# Patient Record
Sex: MALE | Race: WHITE | NOT HISPANIC OR LATINO | Employment: UNEMPLOYED | URBAN - NONMETROPOLITAN AREA
[De-identification: names, ages, dates, MRNs, and addresses within clinical notes are randomized per-mention and may not be internally consistent; named-entity substitution may affect disease eponyms.]

---

## 2023-06-01 ENCOUNTER — OFFICE VISIT (OUTPATIENT)
Dept: INTERNAL MEDICINE | Facility: OTHER | Age: 53
End: 2023-06-01
Payer: COMMERCIAL

## 2023-06-01 VITALS
SYSTOLIC BLOOD PRESSURE: 116 MMHG | TEMPERATURE: 97.8 F | BODY MASS INDEX: 28.71 KG/M2 | HEART RATE: 66 BPM | WEIGHT: 212 LBS | HEIGHT: 72 IN | RESPIRATION RATE: 20 BRPM | OXYGEN SATURATION: 97 % | DIASTOLIC BLOOD PRESSURE: 70 MMHG

## 2023-06-01 DIAGNOSIS — Z98.1 S/P LUMBAR SPINAL FUSION: ICD-10-CM

## 2023-06-01 DIAGNOSIS — Z48.02 VISIT FOR SUTURE REMOVAL: Primary | ICD-10-CM

## 2023-06-01 PROCEDURE — G0463 HOSPITAL OUTPT CLINIC VISIT: HCPCS

## 2023-06-01 PROCEDURE — 99202 OFFICE O/P NEW SF 15 MIN: CPT

## 2023-06-01 RX ORDER — LANOLIN ALCOHOL/MO/W.PET/CERES
3 CREAM (GRAM) TOPICAL
COMMUNITY
Start: 2022-07-08

## 2023-06-01 RX ORDER — BISACODYL 10 MG
10 SUPPOSITORY, RECTAL RECTAL
COMMUNITY
Start: 2022-04-20

## 2023-06-01 RX ORDER — METOPROLOL TARTRATE 25 MG/1
25 TABLET, FILM COATED ORAL
COMMUNITY
Start: 2022-07-08

## 2023-06-01 RX ORDER — OLANZAPINE 10 MG/1
1 TABLET ORAL AT BEDTIME
COMMUNITY
Start: 2022-07-08

## 2023-06-01 RX ORDER — POLYETHYLENE GLYCOL 3350 17 G/17G
17 POWDER, FOR SOLUTION ORAL
COMMUNITY
Start: 2022-07-08 | End: 2023-01-01

## 2023-06-01 RX ORDER — DULOXETIN HYDROCHLORIDE 60 MG/1
120 CAPSULE, DELAYED RELEASE ORAL
COMMUNITY
Start: 2022-07-08

## 2023-06-01 RX ORDER — BUPROPION HYDROCHLORIDE 100 MG/1
100 TABLET, EXTENDED RELEASE ORAL
COMMUNITY
Start: 2022-07-08

## 2023-06-01 RX ORDER — TAMSULOSIN HYDROCHLORIDE 0.4 MG/1
0.4 CAPSULE ORAL
COMMUNITY
Start: 2022-07-08

## 2023-06-01 RX ORDER — DIAZEPAM 5 MG
5 TABLET ORAL
COMMUNITY
Start: 2023-05-25

## 2023-06-01 RX ORDER — PREDNISONE 10 MG/1
TABLET ORAL
COMMUNITY
Start: 2023-05-12

## 2023-06-01 RX ORDER — CLOPIDOGREL BISULFATE 75 MG/1
75 TABLET ORAL
COMMUNITY
Start: 2022-07-08

## 2023-06-01 RX ORDER — LEVOTHYROXINE SODIUM 175 UG/1
175 TABLET ORAL
COMMUNITY
Start: 2022-07-08

## 2023-06-01 RX ORDER — LITHIUM CARBONATE 300 MG/1
300 CAPSULE ORAL
COMMUNITY
Start: 2022-07-08

## 2023-06-01 RX ORDER — OXYCODONE HYDROCHLORIDE 5 MG/1
5 TABLET ORAL
COMMUNITY
Start: 2023-05-25

## 2023-06-01 RX ORDER — ACETAMINOPHEN 500 MG
1000 TABLET ORAL
COMMUNITY
Start: 2022-04-19

## 2023-06-01 ASSESSMENT — PAIN SCALES - GENERAL: PAINLEVEL: MODERATE PAIN (5)

## 2023-06-01 NOTE — PROGRESS NOTES
Assessment & Plan   Thomas Allen is a 53 year old presenting for the following health issues:      ICD-10-CM    1. Visit for suture removal  Z48.02       2. S/P lumbar spinal fusion  Z98.1         Removed sutures in clinic today-incision is healing well, no signs of erythema, warmth, purulent drainage.  Edges are well approximated and scabbed.  Recommend that he establish care where he will be living locally in [place].  According to paperwork sent by [facility] he will need a follow-up x-ray of his back in 6 weeks recommended that they coordinate getting this set up for him and for him to establish care there along with setting up a follow-up appointment with his surgeon.  Nursing home discharge papers were signed.    No follow-ups on file.    SHEILA Patton CNP  [facility] AND [facility]      Ashley Guzman is a 53 year old, presenting for the following health issues:    Patient presents from clinic for nursing home discharge orders. He was at [facility] for rehabilitation services s/p revision T3-pelvis posterior fusion on 5- at [facility].  He is doing well postoperatively, continues to wear TSLO brace-has not removed bandages from surgical site yet-still needs suture removal.  He is participating in physical and occupational therapy and is progressing well with this.  He is planning on being discharged over the next few days back to his home in [facility].  He will be establishing care locally there.    Discharge Summary Nursing [place] (Establish care and to get discharge orders   Melanie Andrews LPN on 6/1/2023 at 1:19 PM//)    History of Present Illness       Reason for visit:  Establish care and discharge orders    He eats 2-3 servings of fruits and vegetables daily.He consumes 11 or more sweetened beverage(s) daily.He exercises with enough effort to increase his heart rate 9 or less minutes per day.  He exercises with enough effort to increase  his heart rate 3 or less days per week. He is missing 2 dose(s) of medications per week.  He is not taking prescribed medications regularly due to remembering to take.               Review of Systems   Constitutional: Negative for chills and fever.   Musculoskeletal: Positive for back pain.        In TSLO brace, using walker   Neurological: Negative for dizziness.   All other systems reviewed and are negative.           Objective    /70 (BP Location: Right arm, Patient Position: Sitting, Cuff Size: Adult Large)   Pulse 66   Temp 97.8  F (36.6  C)   Resp 20   Ht 1.829 m (6')   Wt 96.2 kg (212 lb)   SpO2 97%   BMI 28.75 kg/m    Body mass index is 28.75 kg/m .  Physical Exam  Vitals reviewed.   Constitutional:       General: He is not in acute distress.     Appearance: He is not toxic-appearing.   Eyes:      General:         Right eye: No discharge.         Left eye: No discharge.      Conjunctiva/sclera: Conjunctivae normal.      Pupils: Pupils are equal, round, and reactive to light.   Musculoskeletal:      Comments: Postoperative bandage removed- incision is healing well, no signs of secondary infection sutures were removed without difficulty   Skin:     General: Skin is warm and dry.      Coloration: Skin is pale.   Neurological:      Mental Status: He is alert. Mental status is at baseline.      Motor: Weakness ( chronic- uses walker) present.

## 2023-06-01 NOTE — PATIENT INSTRUCTIONS
Sutures and bandages removed today- Please let patient shower to remove excess tape marks from skin.    Return visit with PCP or surgeon for patient's 6 week postop follow up x-rays.

## 2023-06-02 PROBLEM — Z98.1 S/P LUMBAR SPINAL FUSION: Status: ACTIVE | Noted: 2023-05-10

## 2023-06-02 PROBLEM — M51.17 INTERVERTEBRAL DISC DISORDER WITH RADICULOPATHY OF LUMBOSACRAL REGION: Status: ACTIVE | Noted: 2021-08-25

## 2023-06-02 PROBLEM — M41.9 SCOLIOSIS: Status: ACTIVE | Noted: 2022-01-17

## 2023-06-02 PROBLEM — F31.9 BIPOLAR 1 DISORDER (H): Status: ACTIVE | Noted: 2022-04-20

## 2023-06-02 ASSESSMENT — ENCOUNTER SYMPTOMS
CHILLS: 0
BACK PAIN: 1
DIZZINESS: 0
FEVER: 0